# Patient Record
Sex: MALE | Race: WHITE | NOT HISPANIC OR LATINO | URBAN - METROPOLITAN AREA
[De-identification: names, ages, dates, MRNs, and addresses within clinical notes are randomized per-mention and may not be internally consistent; named-entity substitution may affect disease eponyms.]

---

## 2018-05-17 ENCOUNTER — OUTPATIENT (OUTPATIENT)
Dept: OUTPATIENT SERVICES | Facility: HOSPITAL | Age: 28
LOS: 1 days | Discharge: HOME | End: 2018-05-17

## 2018-05-17 ENCOUNTER — RESULT REVIEW (OUTPATIENT)
Age: 28
End: 2018-05-17

## 2018-05-23 LAB — SURGICAL PATHOLOGY STUDY: SIGNIFICANT CHANGE UP

## 2021-10-11 ENCOUNTER — INPATIENT (INPATIENT)
Facility: HOSPITAL | Age: 31
LOS: 0 days | Discharge: HOME | DRG: 683 | End: 2021-10-12
Attending: INTERNAL MEDICINE | Admitting: INTERNAL MEDICINE
Payer: COMMERCIAL

## 2021-10-11 VITALS
HEIGHT: 69 IN | RESPIRATION RATE: 18 BRPM | WEIGHT: 220.02 LBS | TEMPERATURE: 99 F | SYSTOLIC BLOOD PRESSURE: 133 MMHG | HEART RATE: 85 BPM | DIASTOLIC BLOOD PRESSURE: 97 MMHG | OXYGEN SATURATION: 96 %

## 2021-10-11 DIAGNOSIS — N17.9 ACUTE KIDNEY FAILURE, UNSPECIFIED: ICD-10-CM

## 2021-10-11 DIAGNOSIS — F41.9 ANXIETY DISORDER, UNSPECIFIED: ICD-10-CM

## 2021-10-11 DIAGNOSIS — Z83.3 FAMILY HISTORY OF DIABETES MELLITUS: ICD-10-CM

## 2021-10-11 DIAGNOSIS — K76.0 FATTY (CHANGE OF) LIVER, NOT ELSEWHERE CLASSIFIED: ICD-10-CM

## 2021-10-11 DIAGNOSIS — Z98.890 OTHER SPECIFIED POSTPROCEDURAL STATES: Chronic | ICD-10-CM

## 2021-10-11 DIAGNOSIS — R11.2 NAUSEA WITH VOMITING, UNSPECIFIED: ICD-10-CM

## 2021-10-11 DIAGNOSIS — K58.0 IRRITABLE BOWEL SYNDROME WITH DIARRHEA: ICD-10-CM

## 2021-10-11 DIAGNOSIS — Z88.0 ALLERGY STATUS TO PENICILLIN: ICD-10-CM

## 2021-10-11 DIAGNOSIS — E86.0 DEHYDRATION: ICD-10-CM

## 2021-10-11 DIAGNOSIS — Z79.899 OTHER LONG TERM (CURRENT) DRUG THERAPY: ICD-10-CM

## 2021-10-11 DIAGNOSIS — Z90.49 ACQUIRED ABSENCE OF OTHER SPECIFIED PARTS OF DIGESTIVE TRACT: Chronic | ICD-10-CM

## 2021-10-11 DIAGNOSIS — J98.11 ATELECTASIS: ICD-10-CM

## 2021-10-11 DIAGNOSIS — Z82.49 FAMILY HISTORY OF ISCHEMIC HEART DISEASE AND OTHER DISEASES OF THE CIRCULATORY SYSTEM: ICD-10-CM

## 2021-10-11 DIAGNOSIS — H11.32 CONJUNCTIVAL HEMORRHAGE, LEFT EYE: ICD-10-CM

## 2021-10-11 DIAGNOSIS — T36.8X5A ADVERSE EFFECT OF OTHER SYSTEMIC ANTIBIOTICS, INITIAL ENCOUNTER: ICD-10-CM

## 2021-10-11 DIAGNOSIS — N45.1 EPIDIDYMITIS: ICD-10-CM

## 2021-10-11 DIAGNOSIS — F12.90 CANNABIS USE, UNSPECIFIED, UNCOMPLICATED: ICD-10-CM

## 2021-10-11 DIAGNOSIS — Z20.822 CONTACT WITH AND (SUSPECTED) EXPOSURE TO COVID-19: ICD-10-CM

## 2021-10-11 DIAGNOSIS — R63.8 OTHER SYMPTOMS AND SIGNS CONCERNING FOOD AND FLUID INTAKE: ICD-10-CM

## 2021-10-11 LAB
ALBUMIN SERPL ELPH-MCNC: 4.5 G/DL — SIGNIFICANT CHANGE UP (ref 3.3–5)
ALP SERPL-CCNC: 92 U/L — SIGNIFICANT CHANGE UP (ref 40–120)
ALT FLD-CCNC: 37 U/L — SIGNIFICANT CHANGE UP (ref 10–45)
ANION GAP SERPL CALC-SCNC: 10 MMOL/L — SIGNIFICANT CHANGE UP (ref 5–17)
APPEARANCE UR: CLEAR — SIGNIFICANT CHANGE UP
AST SERPL-CCNC: 25 U/L — SIGNIFICANT CHANGE UP (ref 10–40)
BACTERIA # UR AUTO: PRESENT /HPF
BASOPHILS # BLD AUTO: 0.03 K/UL — SIGNIFICANT CHANGE UP (ref 0–0.2)
BASOPHILS NFR BLD AUTO: 0.3 % — SIGNIFICANT CHANGE UP (ref 0–2)
BILIRUB SERPL-MCNC: 0.5 MG/DL — SIGNIFICANT CHANGE UP (ref 0.2–1.2)
BILIRUB UR-MCNC: NEGATIVE — SIGNIFICANT CHANGE UP
BUN SERPL-MCNC: 35 MG/DL — HIGH (ref 7–23)
CALCIUM SERPL-MCNC: 9.5 MG/DL — SIGNIFICANT CHANGE UP (ref 8.4–10.5)
CHLORIDE SERPL-SCNC: 102 MMOL/L — SIGNIFICANT CHANGE UP (ref 96–108)
CO2 SERPL-SCNC: 28 MMOL/L — SIGNIFICANT CHANGE UP (ref 22–31)
COLOR SPEC: YELLOW — SIGNIFICANT CHANGE UP
CREAT ?TM UR-MCNC: 165 MG/DL — SIGNIFICANT CHANGE UP
CREAT SERPL-MCNC: 4.09 MG/DL — HIGH (ref 0.5–1.3)
DIFF PNL FLD: ABNORMAL
EOSINOPHIL # BLD AUTO: 0.02 K/UL — SIGNIFICANT CHANGE UP (ref 0–0.5)
EOSINOPHIL NFR BLD AUTO: 0.2 % — SIGNIFICANT CHANGE UP (ref 0–6)
EPI CELLS # UR: SIGNIFICANT CHANGE UP /HPF (ref 0–5)
GLUCOSE SERPL-MCNC: 96 MG/DL — SIGNIFICANT CHANGE UP (ref 70–99)
GLUCOSE UR QL: NEGATIVE — SIGNIFICANT CHANGE UP
HCT VFR BLD CALC: 45.9 % — SIGNIFICANT CHANGE UP (ref 39–50)
HGB BLD-MCNC: 15.2 G/DL — SIGNIFICANT CHANGE UP (ref 13–17)
IMM GRANULOCYTES NFR BLD AUTO: 0.3 % — SIGNIFICANT CHANGE UP (ref 0–1.5)
KETONES UR-MCNC: NEGATIVE — SIGNIFICANT CHANGE UP
LEUKOCYTE ESTERASE UR-ACNC: NEGATIVE — SIGNIFICANT CHANGE UP
LIDOCAIN IGE QN: 22 U/L — SIGNIFICANT CHANGE UP (ref 7–60)
LYMPHOCYTES # BLD AUTO: 2.18 K/UL — SIGNIFICANT CHANGE UP (ref 1–3.3)
LYMPHOCYTES # BLD AUTO: 21.8 % — SIGNIFICANT CHANGE UP (ref 13–44)
MCHC RBC-ENTMCNC: 29.3 PG — SIGNIFICANT CHANGE UP (ref 27–34)
MCHC RBC-ENTMCNC: 33.1 GM/DL — SIGNIFICANT CHANGE UP (ref 32–36)
MCV RBC AUTO: 88.6 FL — SIGNIFICANT CHANGE UP (ref 80–100)
MONOCYTES # BLD AUTO: 0.76 K/UL — SIGNIFICANT CHANGE UP (ref 0–0.9)
MONOCYTES NFR BLD AUTO: 7.6 % — SIGNIFICANT CHANGE UP (ref 2–14)
NEUTROPHILS # BLD AUTO: 6.96 K/UL — SIGNIFICANT CHANGE UP (ref 1.8–7.4)
NEUTROPHILS NFR BLD AUTO: 69.8 % — SIGNIFICANT CHANGE UP (ref 43–77)
NITRITE UR-MCNC: NEGATIVE — SIGNIFICANT CHANGE UP
NRBC # BLD: 0 /100 WBCS — SIGNIFICANT CHANGE UP (ref 0–0)
PH UR: 6 — SIGNIFICANT CHANGE UP (ref 5–8)
PLATELET # BLD AUTO: 225 K/UL — SIGNIFICANT CHANGE UP (ref 150–400)
POTASSIUM SERPL-MCNC: 4.2 MMOL/L — SIGNIFICANT CHANGE UP (ref 3.5–5.3)
POTASSIUM SERPL-SCNC: 4.2 MMOL/L — SIGNIFICANT CHANGE UP (ref 3.5–5.3)
PROT SERPL-MCNC: 7.6 G/DL — SIGNIFICANT CHANGE UP (ref 6–8.3)
PROT UR-MCNC: ABNORMAL MG/DL
RBC # BLD: 5.18 M/UL — SIGNIFICANT CHANGE UP (ref 4.2–5.8)
RBC # FLD: 12.5 % — SIGNIFICANT CHANGE UP (ref 10.3–14.5)
RBC CASTS # UR COMP ASSIST: < 5 /HPF — SIGNIFICANT CHANGE UP
SARS-COV-2 RNA SPEC QL NAA+PROBE: NEGATIVE — SIGNIFICANT CHANGE UP
SODIUM SERPL-SCNC: 140 MMOL/L — SIGNIFICANT CHANGE UP (ref 135–145)
SODIUM UR-SCNC: 32 MMOL/L — SIGNIFICANT CHANGE UP
SP GR SPEC: 1.01 — SIGNIFICANT CHANGE UP (ref 1–1.03)
TSH SERPL-MCNC: 1.9 UIU/ML — SIGNIFICANT CHANGE UP (ref 0.27–4.2)
UROBILINOGEN FLD QL: 0.2 E.U./DL — SIGNIFICANT CHANGE UP
WBC # BLD: 9.98 K/UL — SIGNIFICANT CHANGE UP (ref 3.8–10.5)
WBC # FLD AUTO: 9.98 K/UL — SIGNIFICANT CHANGE UP (ref 3.8–10.5)
WBC UR QL: < 5 /HPF — SIGNIFICANT CHANGE UP

## 2021-10-11 PROCEDURE — 74176 CT ABD & PELVIS W/O CONTRAST: CPT | Mod: 26,MA

## 2021-10-11 PROCEDURE — 76775 US EXAM ABDO BACK WALL LIM: CPT | Mod: 26

## 2021-10-11 PROCEDURE — 99223 1ST HOSP IP/OBS HIGH 75: CPT | Mod: GC

## 2021-10-11 PROCEDURE — 99285 EMERGENCY DEPT VISIT HI MDM: CPT

## 2021-10-11 RX ORDER — FLUOXETINE HCL 10 MG
1 CAPSULE ORAL
Qty: 0 | Refills: 0 | DISCHARGE

## 2021-10-11 RX ORDER — ONDANSETRON 8 MG/1
4 TABLET, FILM COATED ORAL ONCE
Refills: 0 | Status: COMPLETED | OUTPATIENT
Start: 2021-10-11 | End: 2021-10-11

## 2021-10-11 RX ORDER — METOCLOPRAMIDE HCL 10 MG
10 TABLET ORAL ONCE
Refills: 0 | Status: COMPLETED | OUTPATIENT
Start: 2021-10-11 | End: 2021-10-11

## 2021-10-11 RX ORDER — SODIUM CHLORIDE 9 MG/ML
1000 INJECTION INTRAMUSCULAR; INTRAVENOUS; SUBCUTANEOUS ONCE
Refills: 0 | Status: COMPLETED | OUTPATIENT
Start: 2021-10-11 | End: 2021-10-11

## 2021-10-11 RX ORDER — PANTOPRAZOLE SODIUM 20 MG/1
40 TABLET, DELAYED RELEASE ORAL
Refills: 0 | Status: DISCONTINUED | OUTPATIENT
Start: 2021-10-11 | End: 2021-10-12

## 2021-10-11 RX ADMIN — SODIUM CHLORIDE 1000 MILLILITER(S): 9 INJECTION INTRAMUSCULAR; INTRAVENOUS; SUBCUTANEOUS at 17:23

## 2021-10-11 RX ADMIN — SODIUM CHLORIDE 1000 MILLILITER(S): 9 INJECTION INTRAMUSCULAR; INTRAVENOUS; SUBCUTANEOUS at 18:49

## 2021-10-11 RX ADMIN — SODIUM CHLORIDE 1000 MILLILITER(S): 9 INJECTION INTRAMUSCULAR; INTRAVENOUS; SUBCUTANEOUS at 18:42

## 2021-10-11 RX ADMIN — ONDANSETRON 4 MILLIGRAM(S): 8 TABLET, FILM COATED ORAL at 17:19

## 2021-10-11 RX ADMIN — Medication 20 MILLIGRAM(S): at 17:19

## 2021-10-11 RX ADMIN — SODIUM CHLORIDE 1000 MILLILITER(S): 9 INJECTION INTRAMUSCULAR; INTRAVENOUS; SUBCUTANEOUS at 22:47

## 2021-10-11 RX ADMIN — Medication 104 MILLIGRAM(S): at 19:06

## 2021-10-11 NOTE — H&P ADULT - PROBLEM SELECTOR PLAN 6
F: s/p 2L NS bolus F: s/p 3L NS bolus in ED  E: Replete PRN (Caution with KAIT)  N: Diet, Renal  DVT ppx: None (IMPROVE 0)  GI ppx: Protonix  Bowel: None  Dispo: [facility] --> Home    FULL CODE

## 2021-10-11 NOTE — H&P ADULT - HISTORY OF PRESENT ILLNESS
30 yo M with PMHx ______ px to [facility] ED on 10/11 with 2d hx of nausea, vomiting, and chills. Patient reports after onset of sx, he presented to an urgent care center and was referred to px to the ED today for further workup and imaging.     Of note, patient reports recently being tx with 7d course of ciprofloxacin after px with testicular pain, likely epididymidis.     ED COURSE  VS: T 99F (oral), HR 85, /97, RR 18, SpO2 96% (RA)  Labs: BUN/Cr 35/4.09  UA: Negative, FeNa 0.6% (Pre-renal)   CTAP: Linear atelectasis in RML and B/L LL, fatty liver, no hydronephrosis, infiltration of perinephric fat B/L (can be seen in intrinsic renal failure)  Orders: Dicyclomine 20mg PO x1, Reglan 10mg IV x1, Zofran 4mg IV x1, 2L NS bolus    Patient is being admitted to [facility] for further management and assessment of acute renal failure of unclear etiology.  30 yo M with PMHx anxiety (fluoxetine), IBS px to [facility] ED on 10/11 with 2d hx of nausea, vomiting, and chills. Patient reports after onset of sx, he presented to an urgent care center and was referred to px to the ED today for further workup and imaging.     Of note, patient reports recently being tx with 7d course of ciprofloxacin after px with testicular pain, likely epididymidis.     ED COURSE  VS: T 99F (oral), HR 85, /97, RR 18, SpO2 96% (RA)  Labs: BUN/Cr 35/4.09  UA: Negative, FeNa 0.6% (Pre-renal)   CTAP: Linear atelectasis in RML and B/L LL, fatty liver, no hydronephrosis, infiltration of perinephric fat B/L (can be seen in intrinsic renal failure)  Orders: Dicyclomine 20mg PO x1, Reglan 10mg IV x1, Zofran 4mg IV x1, 2L NS bolus    Patient is being admitted to [facility] for further management and assessment of acute renal failure of unclear etiology.  30 yo M with PMHx anxiety (fluoxetine), IBS px to [facility] ED on 10/11 with 2d hx of nausea, vomiting, and chills.     Patient reports that for approx 2 wks, he had been experiencing loose BMs consistent with his usual IBS flares which had resolved 2 wks before current admission. No recent dietary changes, new foods, or other sx at that time. He reports on Wednesday 10/6, he began experiencing testicular swelling and pain and presented to an urgent care center where he was prescribed a 7d course of ciprofloxacin which he reports adherence     Patient reports after onset of sx, he presented to an urgent care center and was referred to px to the ED today for further workup and imaging.     Of note, patient reports recently being tx with 7d course of ciprofloxacin after px with testicular pain, likely epididymidis.     ED COURSE  VS: T 99F (oral), HR 85, /97, RR 18, SpO2 96% (RA)  Labs: BUN/Cr 35/4.09  UA: Negative, FeNa 0.6% (Pre-renal)   CTAP: Linear atelectasis in RML and B/L LL, fatty liver, no hydronephrosis, infiltration of perinephric fat B/L (can be seen in intrinsic renal failure)  Orders: Dicyclomine 20mg PO x1, Reglan 10mg IV x1, Zofran 4mg IV x1, 2L NS bolus    Patient is being admitted to [facility] for further management and assessment of acute renal failure of unclear etiology.  30 yo M with PMHx anxiety (fluoxetine), IBS px to [facility] ED on 10/11 with 2d hx of nausea, vomiting, and chills.     Patient reports that for approx 2 wks, he had been experiencing loose BMs consistent with his usual IBS flares which had resolved 2 wks before current admission. No recent dietary changes, new foods, or other sx at that time. He reports on Wednesday 10/6, he began experiencing testicular swelling and pain and presented to an urgent care center where he was prescribed a 7d course of ciprofloxacin which he reports adherence, last dose taken Luis A 10/9 when he began experiencing nausea, NBNB vomiting, and chills. No sick contacts or recent travel. He presented to an urgent care earlier today on day of admission who recommended he present to an ED for further imaging and evaluation.     On ROS, he reports decreased PO intake over the past week as well as generalized abdominal pain. He denies fevers, chills, chest pain, SOB, CEDEÑO, diarrhea, dysuria, testicular pain, urinary retention. Emesis is non-bloody, non-bilious with food particles only. He states his stools have been more formed recently as well with resolution of diarrhea.    ED COURSE  VS: T 99F (oral), HR 85, /97, RR 18, SpO2 96% (RA)  Labs: BUN/Cr 35/4.09  UA: Negative, FeNa 0.6% (Pre-renal)   CTAP: Linear atelectasis in RML and B/L LL, fatty liver, no hydronephrosis, infiltration of perinephric fat B/L (can be seen in intrinsic renal failure)  Orders: Dicyclomine 20mg PO x1, Reglan 10mg IV x1, Zofran 4mg IV x1, 2L NS bolus    Patient is being admitted to [facility] for further management and assessment of acute renal failure of unclear etiology, likely pre-renal.  30 yo M with PMHx anxiety (fluoxetine), IBS px to [facility] ED on 10/11 with 2d hx of nausea, vomiting, and chills.     Patient reports that for approx 2 wks, he had been experiencing loose BMs consistent with his usual IBS flares which had resolved 2 wks before current admission. No recent dietary changes, new foods, or other sx at that time. He reports on Wednesday 10/6, he began experiencing testicular swelling and pain and presented to an urgent care center where he was prescribed a 7d course of ciprofloxacin which he reports adherence, last dose taken Luis A 10/9 when he began experiencing nausea, NBNB vomiting, and chills. No sick contacts or recent travel. He presented to an urgent care earlier today on day of admission who recommended he present to an ED for further imaging and evaluation.     On ROS, he reports decreased PO intake over the past week as well as generalized abdominal pain. He denies fevers, chest pain, SOB, CEDEÑO, diarrhea, dysuria, testicular pain, urinary retention. Emesis is non-bloody, non-bilious with food particles only. He states his stools have been more formed recently as well with resolution of diarrhea.    ED COURSE  VS: T 99F (oral), HR 85, /97, RR 18, SpO2 96% (RA)  Labs: BUN/Cr 35/4.09  UA: Negative, FeNa 0.6% (Pre-renal)   CTAP: Linear atelectasis in RML and B/L LL, fatty liver, no hydronephrosis, infiltration of perinephric fat B/L (can be seen in intrinsic renal failure)  Orders: Dicyclomine 20mg PO x1, Reglan 10mg IV x1, Zofran 4mg IV x1, 2L NS bolus    Patient is being admitted to [facility] for further management and assessment of acute renal failure of unclear etiology, likely pre-renal.

## 2021-10-11 NOTE — ED ADULT TRIAGE NOTE - CHIEF COMPLAINT QUOTE
Pt c/o generalized abdominal pain for x2 days with +N/V and chills. Pt denies dysuria, diarrhea, or documented fevers. Seen at [facility] and told to come to ED for CT. Hx of appendectomy. Pt currently on abx for "surrounding part of my testicle being inflamed."

## 2021-10-11 NOTE — ED PROVIDER NOTE - CLINICAL SUMMARY MEDICAL DECISION MAKING FREE TEXT BOX
plan labs, ivf, ua, tx symptoms and reassess.  will need CT given new renal failure to r/o obstruction.

## 2021-10-11 NOTE — H&P ADULT - PROBLEM SELECTOR PLAN 4
On CT on admission, findings concerning for atelectasis in RML and B/L LL. Not in respiratory distress and denies SOB/CEDEÑO. No hx of asthma, COPD. Social hx notable for daily use of vape pen for marijuana.   - Encourage vape pen cessation   - Monitor respiratory status

## 2021-10-11 NOTE — ED ADULT NURSE NOTE - OBJECTIVE STATEMENT
Presents to ED c/o vomiting and abdominal pain. Per patient he had testicular pain last week, started on ciprofloxacin and testicular pain has resolved since. Pt experienced nausea, vomiting x 24 hours since Saturday. Reports undigested food as vomitus. Denies diarrhea, no sick contacts. Reports last episode of vomiting at 4am this morning, no more episodes since taking zofran ODT.

## 2021-10-11 NOTE — H&P ADULT - ATTENDING COMMENTS
reviewed pertinent data , h&p  patient seen and examined overnight     pt w/ recurring epididymitis , last one occuring last year in setting of jujitsu event, took ciprofloxacin w/ no adverse event. given cipro for recurring symptoms last week in setting of sitting on couch.     pt in NAD, left medial eye, cardiac, lung findings as above, pt w/ slightly dry MM, pt w/ soft abdomen, nontender, nondistended.  exam deferred by patient. no lower ext edema/ tenderness b/l     1. KAIT in setting of pre-renal effects of n/v from likely adverse effects of ciprofloxacin, listed as intolerance. will avoid. on IVFs o/n, followup AM renal function, renal sonogram, appreciate renal recs    2.  epididymitis: pt reports that  symptoms resolved for last 2 days, followup testicular sonogram. agree w/ holding off on further abx, pt deferring STD testing.        rest of  plan as above

## 2021-10-11 NOTE — H&P ADULT - PROBLEM SELECTOR PLAN 2
- F/U EKG (QTC)  - Zofran PRN On admission, initial labs remarkable for BUN/Cr 35/4.09 with negative UA however urine lytes consistent with pre-renal etiology (FeNa 0.6%) and patient with decreased PO intake on hx and dry MM on exam. Pt reports no hx of renal disease/dysfxn and no urinary sx on ROS. Denies dysuria, hematuria, or urinary retention.   - C/w IVF (s/p 3L NS ordered in ED)  - F/U repeat UA, urine lytes, protein-creatinine ratio, osm in AM  - Bladder scan (R/O urinary retention)  - F/U renal ultrasound (performed, pending final read)  - Renal consulted On admission, initial labs remarkable for BUN/Cr 35/4.09 with negative UA however urine lytes consistent with pre-renal etiology (FeNa 0.6%) and patient with decreased PO intake on hx and dry MM on exam. Pt reports no hx of renal disease/dysfxn and no urinary sx on ROS. Denies dysuria, hematuria, or urinary retention.   - C/w IVF (s/p 3L NS ordered in ED)  - F/U repeat UA, urine lytes, protein-creatinine ratio, osm in AM  - Bladder scan x1 (R/O urinary retention)  - F/U renal ultrasound (performed, pending final read)  - Renal consulted Pt px with 2d hx of nausea and NBNB vomiting. Not meeting SIRS criteria on admission. Recently tx with abx (ciprofloxacin) for epididymidis. On exam, non-toxic appearing with dry MM and generalized abd tenderness. No CVA tenderness. Nausea/vomiting more likely in setting of abx use, less likely infectious in origin.   - F/U EKG (QTC)  - Zofran PRN for nausea/vomiting  - Protonix QD for GI ppx

## 2021-10-11 NOTE — CHART NOTE - NSCHARTNOTEFT_GEN_A_CORE
Nephrology Chart Note    Patient is a 31y old  Male who presents with a chief complaint of Abdominal pain (11 Oct 2021 20:24)      HPI:  31M w/ no known hx of kidney disease p/w  2d hx of nausea, vomiting, and chills. Per chart history and team; pt recently treated outpatient for epididymidis with ciprofloxacin. He has received 2L of NS in the ED  and is still currently making urine. On CT scan found to have bilateral perinephric fat stranding. On labs in ED found to have creatinine of 4.09 concerning for acute renal failure.     ED COURSE  VS: T 99F (oral), HR 85, /97, RR 18, SpO2 96% (RA)  Labs: BUN/Cr 35/4.09  UA: Negative, FeNa 0.6% (Pre-renal)   CTAP: Linear atelectasis in RML and B/L LL, fatty liver, no hydronephrosis, infiltration of perinephric fat B/L (can be seen in intrinsic renal failure)  Orders: Dicyclomine 20mg PO x1, Reglan 10mg IV x1, Zofran 4mg IV x1, 2L NS bolus      PAST MEDICAL & SURGICAL HISTORY:        Allergies:  penicillin (Unknown)      Home Medications:       Hospital Medications:   MEDICATIONS  (STANDING):      SOCIAL HISTORY:  Denies ETOh, Smoking,     Family History:  FAMILY HISTORY:  non-contributory      VITALS:  T(F): 99 (10-11-21 @ 16:14), Max: 99 (10-11-21 @ 16:14)  HR: 85 (10-11-21 @ 16:14)  BP: 133/97 (10-11-21 @ 16:14)  RR: 18 (10-11-21 @ 16:14)  SpO2: 96% (10-11-21 @ 16:14)  Wt(kg): --    Height (cm): 175.3 (10-11 @ 16:14)  Weight (kg): 99.8 (10-11 @ 16:14)  BMI (kg/m2): 32.5 (10-11 @ 16:14)  BSA (m2): 2.15 (10-11 @ 16:14)  CAPILLARY BLOOD GLUCOSE              LABS:  10-11    140  |  102  |  35<H>  ----------------------------<  96  4.2   |  28  |  4.09<H>    Ca    9.5      11 Oct 2021 17:18    TPro  7.6  /  Alb  4.5  /  TBili  0.5  /  DBili      /  AST  25  /  ALT  37  /  AlkPhos  92  10    Creatinine Trend: 4.09 <--                        15.2   9.98  )-----------( 225      ( 11 Oct 2021 17:17 )             45.9     Urine Studies:  Urinalysis Basic - ( 11 Oct 2021 17:18 )    Color: Yellow / Appearance: Clear / S.015 / pH:   Gluc:  / Ketone: NEGATIVE  / Bili: Negative / Urobili: 0.2 E.U./dL   Blood:  / Protein: Trace mg/dL / Nitrite: NEGATIVE   Leuk Esterase: NEGATIVE / RBC: < 5 /HPF / WBC < 5 /HPF   Sq Epi:  / Non Sq Epi: 0-5 /HPF / Bacteria: Present /HPF      Sodium, Random Urine: 32 mmol/L (10-11 @ 19:26)  Creatinine, Random Urine: 165 mg/dL (10-11 @ 19:26)        Assessment/Plan:     31M w/ no known hx of kidney disease p/w  2d hx of nausea, vomiting, and chills found to have Cr of 4.09 concerning for ARF    #KAIT probably pre-renal  normal baseline creatinine unknown,   Pt presenting to the ED in the setting of nausea and vomiting for 2 days with chills. On labs found to have Cr of 4.09  Bun of 35, FeNa calculated to be 0.6% suggestive of pre-renal etiology. Li 32 after receiving 2L of NS in the ED.  Has findings of bilateral perinephric stranding on CT. His UA has trace blood, protein and bacteria. Other things to consider would be  nephrolithiasis vs pyelonephritis as culprits. Or his kidney function can be decreased in the setting of poor PO in take w/ vomiting  -Trend BMP daily  -Repeat UA, urine sodium, osm and creatinine w/ urine protein creatinine ratio  -renal sono to rule out obstruction  -strict I/Os   -renal diet   -avoid nephrotoxic meds     Thank you for the opportunity to participate in the care of your patient. The nephrology service remains available to assist with any questions or concerns. Please feel free to reach us by paging the on-call nephrology fellow for urgent issues or as below.     Hermelindo Barrow D.O.  PGY 4 - Nephrology Fellow  [phone]

## 2021-10-11 NOTE — H&P ADULT - PROBLEM SELECTOR PLAN 1
- FeNa 0.6% (Pre-renal)  - Renal consulted in ED - FeNa 0.6% (Pre-renal)  - F/U repeat UA, urine lytes, protein-creatinine ratio, osm in AM  - Bladder scan (R/O urinary retention)  - Renal consulted Pt px with 2d hx of nausea and NBNB vomiting. Not meeting SIRS criteria on admission. Recently tx with abx (ciprofloxacin) for epididymidis. On exam, non-toxic appearing with dry MM and generalized abd tenderness. No CVA tenderness. Nausea/vomiting more likely in setting of abx use, less likely infectious in origin.   - F/U EKG (QTC)  - Zofran PRN Pt px with 2d hx of nausea and NBNB vomiting. Not meeting SIRS criteria on admission. Recently tx with abx (ciprofloxacin) for epididymidis. On exam, non-toxic appearing with dry MM and generalized abd tenderness. No CVA tenderness. Nausea/vomiting more likely in setting of abx use, less likely infectious in origin.   - F/U EKG (QTC)  - Zofran PRN for nausea/vomiting  - Protonix QD for GI ppx On admission, initial labs remarkable for BUN/Cr 35/4.09 with negative UA however urine lytes consistent with pre-renal etiology (FeNa 0.6%) and patient with decreased PO intake on hx and dry MM on exam. Pt reports no hx of renal disease/dysfxn and no urinary sx on ROS. Denies dysuria, hematuria, or urinary retention.   - C/w IVF (s/p 3L NS ordered in ED)  - F/U repeat UA, urine lytes, protein-creatinine ratio, osm in AM  - Bladder scan x1 (R/O urinary retention)  - F/U renal ultrasound (performed, pending final read)  - Renal consulted

## 2021-10-11 NOTE — H&P ADULT - NSHPSOCIALHISTORY_GEN_ALL_CORE
Smoking 1 cigarette/month, former smoker of 1PPD x several years. Social etOH use, 1 drink per month. Reports daily marijuana use via vape pen. Lives at home in apartment with girlfriend. Ambulates on own at baseline. Able to complete all ADLs. Smoking 1 cigarette/month, former smoker of 1PPD x several years. Social etOH use, 1 drink per month. Reports daily marijuana use via vape pen. Lives at home in apartment with girlfriend. Monogamous with girlfriend only, uses barrier protection intermittently. No concern for STI, reports being tested recently including for HIV and was negative. Ambulates on own at baseline. Able to complete all ADLs.

## 2021-10-11 NOTE — H&P ADULT - NSHPPHYSICALEXAM_GEN_ALL_CORE
VITAL SIGNS:  T(C): 37.2 (10-11-21 @ 16:14), Max: 37.2 (10-11-21 @ 16:14)  T(F): 99 (10-11-21 @ 16:14), Max: 99 (10-11-21 @ 16:14)  HR: 85 (10-11-21 @ 16:14) (85 - 85)  BP: 133/97 (10-11-21 @ 16:14) (133/97 - 133/97)  RR: 18 (10-11-21 @ 16:14) (18 - 18)  SpO2: 96% (10-11-21 @ 16:14) (96% - 96%)    PHYSICAL EXAM:    Constitutional: WDWN resting comfortably in bed; NAD  Head: NC/AT  Eyes: PERRL, EOMI, clear conjunctiva  ENT: no nasal discharge; uvula midline, no oropharyngeal erythema or exudates; MMM  Neck: supple; no JVD or thyromegaly  Respiratory: CTA B/L; no W/R/R, no retractions  Cardiac: +S1/S2; RRR; no M/R/G;  Gastrointestinal: soft, NT/ND; no rebound or guarding; +BSx4  Extremities: WWP, no clubbing or cyanosis; no peripheral edema  Musculoskeletal: NROM x4; no joint swelling, tenderness or erythema  Vascular: 2+ radial, femoral, DP/PT pulses B/L  Dermatologic: skin warm, dry and intact; no rashes, wounds, or scars  Neurologic: AAOx3; CNII-XII grossly intact; no focal deficits  Psychiatric: affect and characteristics of appearance, verbalizations, behaviors are appropriate VITAL SIGNS:  T(C): 37.2 (10-11-21 @ 16:14), Max: 37.2 (10-11-21 @ 16:14)  T(F): 99 (10-11-21 @ 16:14), Max: 99 (10-11-21 @ 16:14)  HR: 85 (10-11-21 @ 16:14) (85 - 85)  BP: 133/97 (10-11-21 @ 16:14) (133/97 - 133/97)  RR: 18 (10-11-21 @ 16:14) (18 - 18)  SpO2: 96% (10-11-21 @ 16:14) (96% - 96%)    PHYSICAL EXAM:    Constitutional: WDWN resting comfortably in bed; NAD  Head: NC/AT  Eyes: Small conjunctival hemorrhage in L eye  ENT: dry MM  Respiratory: CTA B/L; no W/R/R, no retractions  Cardiac: +S1/S2; RRR; no M/R/G  Gastrointestinal: soft, TTP in all quadrants, no rebound/guarding. BSx4. No suprapubic tenderness. No CVA tenderness.   Extremities: WWP, no clubbing or cyanosis; no peripheral edema  Musculoskeletal: NROM x4; no joint swelling, tenderness or erythema  Vascular: 2+ radial, DP/PT pulses B/L  Neurologic: AAOx3; CNII-XII grossly intact; no focal deficits

## 2021-10-11 NOTE — H&P ADULT - PROBLEM SELECTOR PLAN 3
Pt reports recently being seen as an outpatient at urgent care for epididymitis. He reports experiencing several day hx of testicular pain and swelling, denies dysuria/hematuria or any other sx. He was started on ciprofloxacin 500mg BID for a total 7d course (10/6-10/12) however he reports taking last dose on 10/9 and has not been able to continue due to 2d hx of nausea/vomiting. Reports sx have improved since starting abx. Pt reports recently being seen as an outpatient at urgent care for epididymitis. He reports experiencing several day hx of testicular pain and swelling, denies dysuria/hematuria or any other sx. He was started on ciprofloxacin 500mg BID for a total 7d course (10/6-10/12) however he reports taking last dose on 10/9 and has not been able to continue due to 2d hx of nausea/vomiting. Reports sx have improved since starting abx.  - Pt deferring STI/HIV testing as reports being monogamous with gf and has tested negative recently   - F/U testicular U/S (ordered)

## 2021-10-11 NOTE — ED ADULT NURSE NOTE - CHIEF COMPLAINT QUOTE
Pt c/o generalized abdominal pain for x2 days with +N/V and chills. Pt denies dysuria, diarrhea, or documented fevers. Seen at [facility] and told to come to ED for CT. Hx of appendectomy. Pt currently on abx for "surrounding part of my testicle being inflamed."

## 2021-10-11 NOTE — H&P ADULT - NSHPLABSRESULTS_GEN_ALL_CORE
LABS:                         15.2   9.98  )-----------( 225      ( 11 Oct 2021 17:17 )             45.9     10-11    140  |  102  |  35<H>  ----------------------------<  96  4.2   |  28  |  4.09<H>    Ca    9.5      11 Oct 2021 17:18    TPro  7.6  /  Alb  4.5  /  TBili  0.5  /  DBili  x   /  AST  25  /  ALT  37  /  AlkPhos  92  10-11      Urinalysis Basic - ( 11 Oct 2021 17:18 )    Color: Yellow / Appearance: Clear / S.015 / pH: x  Gluc: x / Ketone: NEGATIVE  / Bili: Negative / Urobili: 0.2 E.U./dL   Blood: x / Protein: Trace mg/dL / Nitrite: NEGATIVE   Leuk Esterase: NEGATIVE / RBC: < 5 /HPF / WBC < 5 /HPF   Sq Epi: x / Non Sq Epi: 0-5 /HPF / Bacteria: Present /HPF      RADIOLOGY, EKG & ADDITIONAL TESTS: Reviewed.     < from: CT Abdomen and Pelvis No Cont (10.11.21 @ 18:19) >  1. No renal, ureteral, or bladder stone.  2. No hydronephrosis.  3. Infiltration of perinephric fat bilaterally. This could be seen in patients with renal failure. Clinical correlation recommended.  4. Fatty liver.

## 2021-10-11 NOTE — H&P ADULT - PROBLEM SELECTOR PLAN 5
On CT on admission, findings concerning for fatty liver. No abnormalities in liver enzymes on admission. No hx of hepatitis or hepatitis risk factors (no travel to endemic countries, no IVDU). Reports social etOH use, 1 drink/mon with last drink on Saturday prior to admission (1 beer). On exam, generalized TTP in abdomen.   - Outpatient follow-up

## 2021-10-11 NOTE — ED PROVIDER NOTE - OBJECTIVE STATEMENT
31 M hx IBS p/w loose stool, ab cramping and poor po intake.  No fever, chills.  No nephrotoxic agent use.  Denies urinary symptoms.  + FH of renal dz.

## 2021-10-11 NOTE — H&P ADULT - ASSESSMENT
30 yo M with PMHx anxiety (fluoxetine), IBS px to [facility] ED on 10/11 with 2d hx of nausea, vomiting, and chills. On admission, found to have pre-renal KAIT with dry MM on exam however CT findings concerning for intrinsic renal disease. Renal consulted in ED and patient is being admitted to [facility] for further management and assessment of acute renal failure of unclear etiology, likely pre-renal.

## 2021-10-12 ENCOUNTER — TRANSCRIPTION ENCOUNTER (OUTPATIENT)
Age: 31
End: 2021-10-12

## 2021-10-12 VITALS
HEART RATE: 86 BPM | SYSTOLIC BLOOD PRESSURE: 135 MMHG | OXYGEN SATURATION: 97 % | HEIGHT: 69 IN | WEIGHT: 225.09 LBS | DIASTOLIC BLOOD PRESSURE: 78 MMHG | TEMPERATURE: 98 F | RESPIRATION RATE: 18 BRPM

## 2021-10-12 LAB
ANION GAP SERPL CALC-SCNC: 11 MMOL/L — SIGNIFICANT CHANGE UP (ref 5–17)
APPEARANCE UR: CLEAR — SIGNIFICANT CHANGE UP
BILIRUB UR-MCNC: NEGATIVE — SIGNIFICANT CHANGE UP
BLD GP AB SCN SERPL QL: NEGATIVE — SIGNIFICANT CHANGE UP
BUN SERPL-MCNC: 26 MG/DL — HIGH (ref 7–23)
CALCIUM SERPL-MCNC: 8.9 MG/DL — SIGNIFICANT CHANGE UP (ref 8.4–10.5)
CHLORIDE SERPL-SCNC: 106 MMOL/L — SIGNIFICANT CHANGE UP (ref 96–108)
CO2 SERPL-SCNC: 24 MMOL/L — SIGNIFICANT CHANGE UP (ref 22–31)
COLOR SPEC: YELLOW — SIGNIFICANT CHANGE UP
COVID-19 SPIKE DOMAIN AB INTERP: POSITIVE
COVID-19 SPIKE DOMAIN ANTIBODY RESULT: >250 U/ML — HIGH
CREAT ?TM UR-MCNC: 59 MG/DL — SIGNIFICANT CHANGE UP
CREAT ?TM UR-MCNC: 60 MG/DL — SIGNIFICANT CHANGE UP
CREAT SERPL-MCNC: 3.07 MG/DL — HIGH (ref 0.5–1.3)
DIFF PNL FLD: NEGATIVE — SIGNIFICANT CHANGE UP
GLUCOSE SERPL-MCNC: 112 MG/DL — HIGH (ref 70–99)
GLUCOSE UR QL: NEGATIVE — SIGNIFICANT CHANGE UP
HCT VFR BLD CALC: 40.7 % — SIGNIFICANT CHANGE UP (ref 39–50)
HGB BLD-MCNC: 13.6 G/DL — SIGNIFICANT CHANGE UP (ref 13–17)
KETONES UR-MCNC: NEGATIVE — SIGNIFICANT CHANGE UP
LEUKOCYTE ESTERASE UR-ACNC: NEGATIVE — SIGNIFICANT CHANGE UP
MAGNESIUM SERPL-MCNC: 2 MG/DL — SIGNIFICANT CHANGE UP (ref 1.6–2.6)
MCHC RBC-ENTMCNC: 30 PG — SIGNIFICANT CHANGE UP (ref 27–34)
MCHC RBC-ENTMCNC: 33.4 GM/DL — SIGNIFICANT CHANGE UP (ref 32–36)
MCV RBC AUTO: 89.8 FL — SIGNIFICANT CHANGE UP (ref 80–100)
NITRITE UR-MCNC: NEGATIVE — SIGNIFICANT CHANGE UP
NRBC # BLD: 0 /100 WBCS — SIGNIFICANT CHANGE UP (ref 0–0)
OSMOLALITY UR: 333 MOSM/KG — SIGNIFICANT CHANGE UP (ref 300–900)
OSMOLALITY UR: 334 MOSM/KG — SIGNIFICANT CHANGE UP (ref 300–900)
PH UR: 7 — SIGNIFICANT CHANGE UP (ref 5–8)
PHOSPHATE SERPL-MCNC: 4 MG/DL — SIGNIFICANT CHANGE UP (ref 2.5–4.5)
PLATELET # BLD AUTO: 206 K/UL — SIGNIFICANT CHANGE UP (ref 150–400)
POTASSIUM SERPL-MCNC: 4.1 MMOL/L — SIGNIFICANT CHANGE UP (ref 3.5–5.3)
POTASSIUM SERPL-SCNC: 4.1 MMOL/L — SIGNIFICANT CHANGE UP (ref 3.5–5.3)
PROT ?TM UR-MCNC: 7 MG/DL — SIGNIFICANT CHANGE UP (ref 0–12)
PROT UR-MCNC: NEGATIVE MG/DL — SIGNIFICANT CHANGE UP
PROT/CREAT UR-RTO: 0.1 RATIO — SIGNIFICANT CHANGE UP (ref 0–0.2)
RBC # BLD: 4.53 M/UL — SIGNIFICANT CHANGE UP (ref 4.2–5.8)
RBC # FLD: 12.4 % — SIGNIFICANT CHANGE UP (ref 10.3–14.5)
RH IG SCN BLD-IMP: POSITIVE — SIGNIFICANT CHANGE UP
SARS-COV-2 IGG+IGM SERPL QL IA: >250 U/ML — HIGH
SARS-COV-2 IGG+IGM SERPL QL IA: POSITIVE
SODIUM SERPL-SCNC: 141 MMOL/L — SIGNIFICANT CHANGE UP (ref 135–145)
SODIUM UR-SCNC: 113 MMOL/L — SIGNIFICANT CHANGE UP
SODIUM UR-SCNC: 113 MMOL/L — SIGNIFICANT CHANGE UP
SP GR SPEC: 1.01 — SIGNIFICANT CHANGE UP (ref 1–1.03)
URATE SERPL-MCNC: 8.5 MG/DL — SIGNIFICANT CHANGE UP (ref 3.4–8.8)
UROBILINOGEN FLD QL: 0.2 E.U./DL — SIGNIFICANT CHANGE UP
WBC # BLD: 8.61 K/UL — SIGNIFICANT CHANGE UP (ref 3.8–10.5)
WBC # FLD AUTO: 8.61 K/UL — SIGNIFICANT CHANGE UP (ref 3.8–10.5)

## 2021-10-12 PROCEDURE — 87635 SARS-COV-2 COVID-19 AMP PRB: CPT

## 2021-10-12 PROCEDURE — 99285 EMERGENCY DEPT VISIT HI MDM: CPT

## 2021-10-12 PROCEDURE — 81001 URINALYSIS AUTO W/SCOPE: CPT

## 2021-10-12 PROCEDURE — 86900 BLOOD TYPING SEROLOGIC ABO: CPT

## 2021-10-12 PROCEDURE — 93005 ELECTROCARDIOGRAM TRACING: CPT

## 2021-10-12 PROCEDURE — 82550 ASSAY OF CK (CPK): CPT

## 2021-10-12 PROCEDURE — 86850 RBC ANTIBODY SCREEN: CPT

## 2021-10-12 PROCEDURE — 83735 ASSAY OF MAGNESIUM: CPT

## 2021-10-12 PROCEDURE — 84300 ASSAY OF URINE SODIUM: CPT

## 2021-10-12 PROCEDURE — 84156 ASSAY OF PROTEIN URINE: CPT

## 2021-10-12 PROCEDURE — 80048 BASIC METABOLIC PNL TOTAL CA: CPT

## 2021-10-12 PROCEDURE — 81003 URINALYSIS AUTO W/O SCOPE: CPT

## 2021-10-12 PROCEDURE — 80053 COMPREHEN METABOLIC PANEL: CPT

## 2021-10-12 PROCEDURE — 76775 US EXAM ABDO BACK WALL LIM: CPT

## 2021-10-12 PROCEDURE — 99239 HOSP IP/OBS DSCHRG MGMT >30: CPT | Mod: GC

## 2021-10-12 PROCEDURE — 86901 BLOOD TYPING SEROLOGIC RH(D): CPT

## 2021-10-12 PROCEDURE — 83690 ASSAY OF LIPASE: CPT

## 2021-10-12 PROCEDURE — 85027 COMPLETE CBC AUTOMATED: CPT

## 2021-10-12 PROCEDURE — 74176 CT ABD & PELVIS W/O CONTRAST: CPT | Mod: MA

## 2021-10-12 PROCEDURE — 84550 ASSAY OF BLOOD/URIC ACID: CPT

## 2021-10-12 PROCEDURE — 83935 ASSAY OF URINE OSMOLALITY: CPT

## 2021-10-12 PROCEDURE — 96361 HYDRATE IV INFUSION ADD-ON: CPT

## 2021-10-12 PROCEDURE — 84100 ASSAY OF PHOSPHORUS: CPT

## 2021-10-12 PROCEDURE — 84443 ASSAY THYROID STIM HORMONE: CPT

## 2021-10-12 PROCEDURE — 96375 TX/PRO/DX INJ NEW DRUG ADDON: CPT

## 2021-10-12 PROCEDURE — 82570 ASSAY OF URINE CREATININE: CPT

## 2021-10-12 PROCEDURE — 36415 COLL VENOUS BLD VENIPUNCTURE: CPT

## 2021-10-12 PROCEDURE — 96374 THER/PROPH/DIAG INJ IV PUSH: CPT

## 2021-10-12 PROCEDURE — 99253 IP/OBS CNSLTJ NEW/EST LOW 45: CPT

## 2021-10-12 PROCEDURE — 86769 SARS-COV-2 COVID-19 ANTIBODY: CPT

## 2021-10-12 PROCEDURE — 85025 COMPLETE CBC W/AUTO DIFF WBC: CPT

## 2021-10-12 RX ORDER — SODIUM CHLORIDE 9 MG/ML
1000 INJECTION INTRAMUSCULAR; INTRAVENOUS; SUBCUTANEOUS
Refills: 0 | Status: DISCONTINUED | OUTPATIENT
Start: 2021-10-12 | End: 2021-10-12

## 2021-10-12 RX ORDER — CIPROFLOXACIN LACTATE 400MG/40ML
1 VIAL (ML) INTRAVENOUS
Qty: 0 | Refills: 0 | DISCHARGE

## 2021-10-12 RX ORDER — INFLUENZA VIRUS VACCINE 15; 15; 15; 15 UG/.5ML; UG/.5ML; UG/.5ML; UG/.5ML
0.5 SUSPENSION INTRAMUSCULAR ONCE
Refills: 0 | Status: DISCONTINUED | OUTPATIENT
Start: 2021-10-12 | End: 2021-10-12

## 2021-10-12 RX ADMIN — SODIUM CHLORIDE 140 MILLILITER(S): 9 INJECTION INTRAMUSCULAR; INTRAVENOUS; SUBCUTANEOUS at 10:34

## 2021-10-12 RX ADMIN — SODIUM CHLORIDE 140 MILLILITER(S): 9 INJECTION INTRAMUSCULAR; INTRAVENOUS; SUBCUTANEOUS at 06:50

## 2021-10-12 RX ADMIN — PANTOPRAZOLE SODIUM 40 MILLIGRAM(S): 20 TABLET, DELAYED RELEASE ORAL at 06:54

## 2021-10-12 NOTE — CONSULT NOTE ADULT - SUBJECTIVE AND OBJECTIVE BOX
Patient is a 31y old  Male who presents with a chief complaint of Abdominal pain (12 Oct 2021 09:06)      HPI:  31M with pmhx of anxiety who reports to the ED in the setting of 2 days of nausea, fever and chills. Per patient about 2 weeks ago he was having diarrhea like episodes. He  then subsequently started to have testicular pain and was treated with ciprofloxacin for epididymitis and started to experience vomiting every hour for about 1-2 days. During this time period he also endorses going to the park an long boarding which may have contributed to him getting dehydrated. Per patient he has never been told he has any history of kidney disease and does endorse having poor PO intake. Nephrology consulted for acute renal failure    ED COURSE  VS: T 99F (oral), HR 85, /97, RR 18, SpO2 96% (RA)  Labs: BUN/Cr 35/4.09  UA: Negative, FeNa 0.6% (Pre-renal)   CTAP: Linear atelectasis in RML and B/L LL, fatty liver, no hydronephrosis, infiltration of perinephric fat B/L (can be seen in intrinsic renal failure)  Orders: Dicyclomine 20mg PO x1, Reglan 10mg IV x1, Zofran 4mg IV x1, 2L NS bolus    Patient is being admitted to [facility] for further management and assessment of acute renal failure of unclear etiology, likely pre-renal.  (11 Oct 2021 20:24)      PAST MEDICAL & SURGICAL HISTORY:  Anxiety    Irritable bowel syndrome    S/P appendectomy    S/P hernia repair          Allergies:  ciprofloxacin (Vomiting)  penicillin (Rash; Urticaria; Hives)      Home Medications:   influenza   Vaccine 0.5 milliLiter(s) IntraMuscular once  pantoprazole    Tablet 40 milliGRAM(s) Oral before breakfast  sodium chloride 0.9%. 1000 milliLiter(s) IV Continuous <Continuous>      Hospital Medications:   MEDICATIONS  (STANDING):  influenza   Vaccine 0.5 milliLiter(s) IntraMuscular once  pantoprazole    Tablet 40 milliGRAM(s) Oral before breakfast  sodium chloride 0.9%. 1000 milliLiter(s) (140 mL/Hr) IV Continuous <Continuous>      SOCIAL HISTORY:  Denies ETOh, Smoking,     Family History:  FAMILY HISTORY:  FHx: hypertension (Mother)    FHx: diabetes mellitus (Mother)          VITALS:  T(F): 98.5 (10-12-21 @ 10:47), Max: 99 (10-11-21 @ 16:14)  HR: 86 (10-12-21 @ 10:47)  BP: 135/78 (10-12-21 @ 10:47)  RR: 18 (10-12-21 @ 10:47)  SpO2: 97% (10-12-21 @ 10:47)  Wt(kg): --    Height (cm): 175.3 (10-12 @ 10:47)  Weight (kg): 102.1 (10-12 @ 10:47)  BMI (kg/m2): 33.2 (10-12 @ 10:47)  BSA (m2): 2.17 (10-12 @ 10:47)  CAPILLARY BLOOD GLUCOSE          Review of Systems:  10 point ROS negative except as per HPI    PHYSICAL EXAM:  GENERAL: Alert, awake, oriented x3   HEENT: MARTIN, EOMI, neck supple, no JVP  CHEST/LUNG: Bilateral clear breath sounds  HEART: Regular rate and rhythm, no murmur, no gallops, no rub   ABDOMEN: Soft, nontender, non distended  EXTREMITIES: no pedal edema  Neurology: AAOx3, no focal neurological deficit    LABS:  10-12    141  |  106  |  26<H>  ----------------------------<  112<H>  4.1   |  24  |  3.07<H>    Ca    8.9      12 Oct 2021 06:27  Phos  4.0     10-12  Mg     2.0     10-12    TPro  7.6  /  Alb  4.5  /  TBili  0.5  /  DBili      /  AST  25  /  ALT  37  /  AlkPhos  92  10-    Creatinine Trend: 3.07 <--, 4.09 <--                        13.6   8.61  )-----------( 206      ( 12 Oct 2021 06:27 )             40.7     Urine Studies:  Urinalysis Basic - ( 11 Oct 2021 17:18 )    Color: Yellow / Appearance: Clear / S.015 / pH:   Gluc:  / Ketone: NEGATIVE  / Bili: Negative / Urobili: 0.2 E.U./dL   Blood:  / Protein: Trace mg/dL / Nitrite: NEGATIVE   Leuk Esterase: NEGATIVE / RBC: < 5 /HPF / WBC < 5 /HPF   Sq Epi:  / Non Sq Epi: 0-5 /HPF / Bacteria: Present /HPF      Sodium, Random Urine: 32 mmol/L (10-11 @ 19:26)  Creatinine, Random Urine: 165 mg/dL (10-11 @ 19:26)

## 2021-10-12 NOTE — DISCHARGE NOTE PROVIDER - NSFOLLOWUPCLINICS_GEN_ALL_ED_FT
[facility]  Family Medicine  [address], [address] Floor  [place], [place] [address]  Phone: [phone]  Fax:      [facility]  Family Medicine  [address] [place] [address], [address] Floor  [place], [place] [address]  Phone: [phone]  Fax:   Scheduled Appointment: 10/21/2021 9:15 AM

## 2021-10-12 NOTE — PROGRESS NOTE ADULT - ASSESSMENT
32 yo M with PMHx anxiety (fluoxetine), IBS px to [facility] ED on 10/11 with 2d hx of nausea, vomiting, and chills. On admission, found to have pre-renal KAIT with dry MM on exam however CT findings concerning for intrinsic renal disease. Renal consulted in ED and patient is being admitted to [facility] for further management and assessment of acute renal failure of unclear etiology, likely pre-renal.

## 2021-10-12 NOTE — DISCHARGE NOTE PROVIDER - HOSPITAL COURSE
#Discharge: do not delete    32 yo M with PMHx anxiety (fluoxetine), IBS px to [facility] ED on 10/11 with 2d hx of nausea, vomiting, and chills, found to have pre-renal KAIT with dry MM on exam however CT findings concerning for intrinsic renal disease.    Hospital course (by problem):     #Acute renal failure.   On admission, initial labs remarkable for BUN/Cr 35/4.09 with negative UA however urine lytes consistent with pre-renal etiology (FeNa 0.6%) and patient with decreased PO intake on hx and dry MM on exam. Pt reports no hx of renal disease/dysfxn and no urinary sx on ROS. Denies dysuria, hematuria, or urinary retention.   - Renal U/S showing infiltration of perinephric fat B/L  - S/P 3L NS IVF   - Bladder scan negative for retention    #Nausea with vomiting.   Pt px with 2d hx of nausea and NBNB vomiting. Not meeting SIRS criteria on admission. Recently tx with abx (ciprofloxacin) for epididymidis. On exam, non-toxic appearing with dry MM and generalized abd tenderness. No CVA tenderness. Nausea/vomiting more likely in setting of abx use, less likely infectious in origin. QTc WNL  - Zofran PRN for nausea/vomiting  - Protonix QD for GI ppx.    #Epididymitis.   Pt reports recently being seen as an outpatient at urgent care for epididymitis. He reports experiencing several day hx of testicular pain and swelling, denies dysuria/hematuria or any other sx. He was started on ciprofloxacin 500mg BID for a total 7d course (10/6-10/12) however he reports taking last dose on 10/9 and has not been able to continue due to 2d hx of nausea/vomiting. Reports sx have improved since starting abx.  - Pt deferring STI/HIV testing as reports being monogamous with gf and has tested negative recently     #Atelectasis.   On CT on admission, findings concerning for atelectasis in RML and B/L LL. Not in respiratory distress and denies SOB/CEDEÑO. No hx of asthma, COPD. Social hx notable for daily use of vape pen for marijuana.   - Encouraged vape pen cessation   - Respiration status normal    #Fatty liver.   On CT on admission, findings concerning for fatty liver. No abnormalities in liver enzymes on admission. No hx of hepatitis or hepatitis risk factors (no travel to endemic countries, no IVDU). Reports social etOH use, 1 drink/mon with last drink on Saturday prior to admission (1 beer). On exam, generalized TTP in abdomen.   - Outpatient follow-up    Patient was discharged to: Home    New medications: none  Changes to old medications: none  Medications that were stopped: Stop ciprofloxacin    Items to follow up as outpatient: PCP -- Follow up Creatinine    Physical exam at the time of discharge:  Constitutional: Pt resting comfortably in bed; NAD  Head: NC/AT  Eyes: Small conjunctival hemorrhage in L eye  ENT: MMM  Respiratory: CTA B/L; no W/R/R, no retractions  Cardiac: +S1/S2; RRR; no M/R/G  Gastrointestinal: soft, TTP diffusely, no rebound/guarding. BSx4. No suprapubic tenderness. No CVA tenderness.   Extremities: WWP, no clubbing or cyanosis; no peripheral edema  Musculoskeletal: NROM x4; no joint swelling, tenderness or erythema  Vascular: 2+ radial, DP/PT pulses B/L  Neurologic: AAOx3; CNII-XII grossly intact; no focal deficits       #Discharge: do not delete    30 yo M with PMHx anxiety (fluoxetine), IBS px to [facility] ED on 10/11 with 2d hx of nausea, vomiting, and chills, found to have pre-renal KAIT with dry MM on exam however CT findings concerning for intrinsic renal disease.    Hospital course (by problem):     #Acute renal failure.   On admission, initial labs remarkable for BUN/Cr 35/4.09 with negative UA however urine lytes consistent with pre-renal etiology (FeNa 0.6%) and patient with decreased PO intake on hx and dry MM on exam. Pt reports no hx of renal disease/dysfxn and no urinary sx on ROS. Denies dysuria, hematuria, or urinary retention.   - Improvement of Cr 3.07  - Renal U/S showing MRD  - S/P 3L NS IVF   - Bladder scan negative for retention    #Nausea with vomiting.   Pt px with 2d hx of nausea and NBNB vomiting. Not meeting SIRS criteria on admission. Recently tx with abx (ciprofloxacin) for epididymidis. On exam, non-toxic appearing with dry MM and generalized abd tenderness. No CVA tenderness. Nausea/vomiting more likely in setting of abx use, less likely infectious in origin. QTc WNL  - Zofran PRN for nausea/vomiting  - Protonix QD for GI ppx.    #Epididymitis.   Pt reports recently being seen as an outpatient at urgent care for epididymitis. He reports experiencing several day hx of testicular pain and swelling, denies dysuria/hematuria or any other sx. He was started on ciprofloxacin 500mg BID for a total 7d course (10/6-10/12) however he reports taking last dose on 10/9 and has not been able to continue due to 2d hx of nausea/vomiting. Reports sx have improved since starting abx.  - Pt deferring STI/HIV testing as reports being monogamous with gf and has tested negative recently     #Atelectasis.   On CT on admission, findings concerning for atelectasis in RML and B/L LL. Not in respiratory distress and denies SOB/CEDEÑO. No hx of asthma, COPD. Social hx notable for daily use of vape pen for marijuana.   - Encouraged vape pen cessation   - Respiration status normal    #Fatty liver.   On CT on admission, findings concerning for fatty liver. No abnormalities in liver enzymes on admission. No hx of hepatitis or hepatitis risk factors (no travel to endemic countries, no IVDU). Reports social etOH use, 1 drink/mon with last drink on Saturday prior to admission (1 beer). On exam, generalized TTP in abdomen.   - Outpatient follow-up    Patient was discharged to: Home    New medications: none  Changes to old medications: none  Medications that were stopped: Stop ciprofloxacin    Items to follow up as outpatient: PCP -- Follow up Creatinine    Physical exam at the time of discharge:  Constitutional: Pt resting comfortably in bed; NAD  Head: NC/AT  Eyes: Small conjunctival hemorrhage in L eye  ENT: MMM  Respiratory: CTA B/L; no W/R/R, no retractions  Cardiac: +S1/S2; RRR; no M/R/G  Gastrointestinal: soft, TTP diffusely, no rebound/guarding. BSx4. No suprapubic tenderness. No CVA tenderness.   Extremities: WWP, no clubbing or cyanosis; no peripheral edema  Musculoskeletal: NROM x4; no joint swelling, tenderness or erythema  Vascular: 2+ radial, DP/PT pulses B/L  Neurologic: AAOx3; CNII-XII grossly intact; no focal deficits

## 2021-10-12 NOTE — DISCHARGE NOTE PROVIDER - NSDCCPCAREPLAN_GEN_ALL_CORE_FT
PRINCIPAL DISCHARGE DIAGNOSIS  Diagnosis: Acute renal failure (ARF)  Assessment and Plan of Treatment:        PRINCIPAL DISCHARGE DIAGNOSIS  Diagnosis: Acute renal failure (ARF)  Assessment and Plan of Treatment: You presented to the hospital following several days of nausea and vomiting and were found to have signs of acute renal failure or acute kidney failure (KAIT) on labwork.   Acute kidney injury (KAIT) is also called acute kidney failure, or acute renal failure. KAIT happens when your kidneys suddenly stop working correctly. Normally, the kidneys remove fluid, chemicals, and waste from your blood. These wastes are turned into urine by your kidneys. KAIT usually happens over hours or days. When you have KAIT, your kidneys do not remove the waste, chemicals, or extra fluid from your body. A normal amount of urine is not produced. KAIT is usually temporary, but it may become a chronic kidney condition.   While in the hospiital, you received fluids and your labs improved reflecting improvement in your KAIT. Please follow up at [facility] on 10/21/2021 at 9:15am to follow up your lab levels to ensure that your kidneys are recovering fully.

## 2021-10-12 NOTE — CONSULT NOTE ADULT - ATTENDING COMMENTS
KAIT appears to be prerenal and responding to IVF-- no other cause obvous and UA benign. ATN possible if renla fxn stops improving  would cont IVF today -- may be able to dc if eating well and creat cont to fall

## 2021-10-12 NOTE — CONSULT NOTE ADULT - ASSESSMENT
31M with pmhx of anxiety who presents with nausea, vomiting and chills for 2 days w/ poor PO intake. Nephrology consulted for ARF.    Assessment/Plan:   #KAIT likely 2/2 dehydration  Unknown baseline, creatinine peaking at 4  Pt presenting to the ED in the setting of nausea and vomiting for 2 days with chills. On labs found to have Cr of 4.09  Bun of 35, FeNa calculated to be 0.6% suggestive of pre-renal etiology. Li 32 after receiving 2L of NS in the ED. Repeat Cr downtrending. Patient this morning states he feels like he can eat and drink ok and is also still getting IVF.   -Trend BMP daily  -Encourage PO intake; Agree with IVF.  -Would repeat BMP this evening to make sure continues to trend down  -strict I/Os   -renal diet   -avoid nephrotoxic meds     Thank you for the opportunity to participate in the care of your patient. The nephrology service remains available to assist with any questions or concerns. Please feel free to reach us by paging the on-call nephrology fellow for urgent issues or as below.     Hermelindo Barrow D.O.  PGY 4 - Nephrology Fellow  [phone]

## 2021-10-12 NOTE — DISCHARGE NOTE NURSING/CASE MANAGEMENT/SOCIAL WORK - NSDCPEFALRISK_GEN_ALL_CORE
For information on Fall & injury Prevention, visit [email or web address]

## 2021-10-12 NOTE — PROGRESS NOTE ADULT - PROBLEM SELECTOR PLAN 6
F: s/p 3L NS bolus in ED  E: Replete PRN (Caution with KAIT)  N: Diet, Renal  DVT ppx: None (IMPROVE 0)  GI ppx: Protonix  Bowel: None  Dispo: RMF --> Home    FULL CODE

## 2021-10-12 NOTE — DISCHARGE NOTE NURSING/CASE MANAGEMENT/SOCIAL WORK - PATIENT PORTAL LINK FT
You can access the FollowMyHealth Patient Portal offered by [facility] by registering at the following website: [email or web address]. By joining [facility]’s FollowMyHealth portal, you will also be able to view your health information using other applications (apps) compatible with our system.

## 2021-10-12 NOTE — PROGRESS NOTE ADULT - SUBJECTIVE AND OBJECTIVE BOX
***Note in progress***    OVERNIGHT EVENTS: NAEO    SUBJECTIVE / INTERVAL HPI: Patient seen and examined at bedside. Patient denying chest pain, SOB, palpitations, cough. Patient denies fever, chills, HA, Dizziness, change in vision/hearing, N/V, abdominal pain, diarrhea, constipation, hematochezia/melena, dysuria, hematuria, new onset weakness/numbness, LE pain and/or swelling.    Remaining ROS negative       PHYSICAL EXAM:    General: WDWN  HEENT: NC/AT; PERRL, anicteric sclera; MMM  Neck: supple  Cardiovascular: +S1/S2, RRR  Respiratory: CTA B/L; no W/R/R  Gastrointestinal: soft, NT/ND; +BSx4  Extremities: WWP; no edema, clubbing or cyanosis  Vascular: 2+ radial, DP/PT pulses B/L  Neurological: AAOx3; no focal deficits  Psychiatric: pleasant mood and affect  Dermatologic: no appreciable wounds or damage to the skin    VITAL SIGNS:  Vital Signs Last 24 Hrs  T(C): 36.9 (12 Oct 2021 06:46), Max: 37.2 (11 Oct 2021 16:14)  T(F): 98.4 (12 Oct 2021 06:46), Max: 99 (11 Oct 2021 16:14)  HR: 76 (12 Oct 2021 06:46) (65 - 85)  BP: 135/85 (12 Oct 2021 06:46) (133/83 - 156/103)  BP(mean): --  RR: 18 (12 Oct 2021 06:46) (18 - 18)  SpO2: 98% (12 Oct 2021 06:46) (96% - 98%)      MEDICATIONS:  MEDICATIONS  (STANDING):  pantoprazole    Tablet 40 milliGRAM(s) Oral before breakfast  sodium chloride 0.9%. 1000 milliLiter(s) (140 mL/Hr) IV Continuous <Continuous>    MEDICATIONS  (PRN):      ALLERGIES:  Allergies    penicillin (Rash; Urticaria; Hives)    Intolerances    ciprofloxacin (Vomiting)      LABS:                        13.6   8.61  )-----------( 206      ( 12 Oct 2021 06:27 )             40.7     10-12    141  |  106  |  26<H>  ----------------------------<  112<H>  4.1   |  24  |  3.07<H>    Ca    8.9      12 Oct 2021 06:27  Phos  4.0     10-12  Mg     2.0     10-12    TPro  7.6  /  Alb  4.5  /  TBili  0.5  /  DBili  x   /  AST  25  /  ALT  37  /  AlkPhos  92  10-11      Urinalysis Basic - ( 11 Oct 2021 17:18 )    Color: Yellow / Appearance: Clear / S.015 / pH: x  Gluc: x / Ketone: NEGATIVE  / Bili: Negative / Urobili: 0.2 E.U./dL   Blood: x / Protein: Trace mg/dL / Nitrite: NEGATIVE   Leuk Esterase: NEGATIVE / RBC: < 5 /HPF / WBC < 5 /HPF   Sq Epi: x / Non Sq Epi: 0-5 /HPF / Bacteria: Present /HPF      CAPILLARY BLOOD GLUCOSE          RADIOLOGY & ADDITIONAL TESTS: Reviewed.

## 2021-10-12 NOTE — PROGRESS NOTE ADULT - PROBLEM SELECTOR PLAN 1
Pt px with 2d hx of nausea and NBNB vomiting. Not meeting SIRS criteria on admission. Recently tx with abx (ciprofloxacin) for epididymidis. On exam, non-toxic appearing with dry MM and generalized abd tenderness. No CVA tenderness. Nausea/vomiting more likely in setting of abx use, less likely infectious in origin.   - F/U EKG (QTC)  - Zofran PRN for nausea/vomiting  - Protonix QD for GI ppx

## 2021-10-12 NOTE — PROGRESS NOTE ADULT - PROBLEM SELECTOR PLAN 3
Pt reports recently being seen as an outpatient at urgent care for epididymitis. He reports experiencing several day hx of testicular pain and swelling, denies dysuria/hematuria or any other sx. He was started on ciprofloxacin 500mg BID for a total 7d course (10/6-10/12) however he reports taking last dose on 10/9 and has not been able to continue due to 2d hx of nausea/vomiting. Reports sx have improved since starting abx.  - Pt deferring STI/HIV testing as reports being monogamous with gf and has tested negative recently   - F/U testicular U/S (ordered)

## 2021-10-21 ENCOUNTER — APPOINTMENT (OUTPATIENT)
Age: 31
End: 2021-10-21
Payer: COMMERCIAL

## 2021-10-21 ENCOUNTER — MED ADMIN CHARGE (OUTPATIENT)
Age: 31
End: 2021-10-21

## 2021-10-21 ENCOUNTER — NON-APPOINTMENT (OUTPATIENT)
Age: 31
End: 2021-10-21

## 2021-10-21 VITALS
WEIGHT: 229 LBS | BODY MASS INDEX: 33.92 KG/M2 | RESPIRATION RATE: 16 BRPM | DIASTOLIC BLOOD PRESSURE: 92 MMHG | TEMPERATURE: 97.7 F | HEIGHT: 69 IN | HEART RATE: 83 BPM | OXYGEN SATURATION: 97 % | SYSTOLIC BLOOD PRESSURE: 136 MMHG

## 2021-10-21 DIAGNOSIS — Z80.8 FAMILY HISTORY OF MALIGNANT NEOPLASM OF OTHER ORGANS OR SYSTEMS: ICD-10-CM

## 2021-10-21 DIAGNOSIS — Z23 ENCOUNTER FOR IMMUNIZATION: ICD-10-CM

## 2021-10-21 DIAGNOSIS — Z87.19 PERSONAL HISTORY OF OTHER DISEASES OF THE DIGESTIVE SYSTEM: ICD-10-CM

## 2021-10-21 DIAGNOSIS — Z82.49 FAMILY HISTORY OF ISCHEMIC HEART DISEASE AND OTHER DISEASES OF THE CIRCULATORY SYSTEM: ICD-10-CM

## 2021-10-21 DIAGNOSIS — Z83.3 FAMILY HISTORY OF DIABETES MELLITUS: ICD-10-CM

## 2021-10-21 DIAGNOSIS — N17.9 ACUTE KIDNEY FAILURE, UNSPECIFIED: ICD-10-CM

## 2021-10-21 DIAGNOSIS — F41.9 ANXIETY DISORDER, UNSPECIFIED: ICD-10-CM

## 2021-10-21 PROCEDURE — 99203 OFFICE O/P NEW LOW 30 MIN: CPT | Mod: GC,25

## 2021-10-21 PROCEDURE — 36415 COLL VENOUS BLD VENIPUNCTURE: CPT

## 2021-10-21 PROCEDURE — 90686 IIV4 VACC NO PRSV 0.5 ML IM: CPT

## 2021-10-21 PROCEDURE — G0008: CPT

## 2021-10-21 NOTE — PHYSICAL EXAM
[Soft] : abdomen soft [Non Tender] : non-tender [Non-distended] : non-distended [Normal Bowel Sounds] : normal bowel sounds [No CVA Tenderness] : no CVA  tenderness [Normal] : no joint swelling and grossly normal strength and tone

## 2021-10-22 PROBLEM — Z23 NEED FOR INFLUENZA VACCINATION: Status: ACTIVE | Noted: 2021-10-21

## 2021-10-22 PROBLEM — N17.9 ACUTE RENAL FAILURE: Status: ACTIVE | Noted: 2021-10-21

## 2021-10-22 NOTE — PLAN
[FreeTextEntry1] : #Acute renal failure\par Pt was admitted to [facility] with CVA tenderness and N/V in the setting of abx use, pt was found to have a Cr of 4 at time of admission, FeNa .6, Pre renal. Reports no urinary complaints today\par - Follow up BMP\par - Follow up A1c\par \par #IBS\par Pt currently not experinecing symptoms, takes probiotics at home\par - Cont probiotics\par \par #HCM\par -Flu shot today

## 2021-10-22 NOTE — HISTORY OF PRESENT ILLNESS
[FreeTextEntry1] : Establishing care  [de-identified] : 30 y/o M with hx of anxiety and IBS presenting for follow up following discharge from [facility] where he was found to have an KAIT. Pt reports complete resolution of symptoms since discharge but reports baseline abdominal pain secondary to IBS. Denies dysuria, hematuria, back pain, n/v, constipation.

## 2021-10-22 NOTE — END OF VISIT
[FreeTextEntry3] : I saw and evaluated the patient.  The findings and assessment were discussed with the resident and I agree with resident’s plan as documented in the above, in the resident’s note.\par \par Pertinent exam findings: Well-appearing, NAD. \par \par Acute renal failure - Recently hospitalized x1 day.  Cr went from 4->3 w/ IV fluids.  FeNa consistent w/ pre-renal.  Will check a1c to r/o hyperglycemia as a component.  Repeat BMP today.\par \par IBS: Symptoms okay w/ probiotics.  Will consider GI referral in the future if necessary.\par \par Flu shot.\par \par I spent more than 30 minutes for the total time of this encounter, including time spent face-to-face with the patient, chart review, coordinating care, and documentation.

## 2021-10-22 NOTE — REVIEW OF SYSTEMS
[Abdominal Pain] : abdominal pain [Joint Pain] : joint pain [Negative] : Heme/Lymph [Nausea] : no nausea [Constipation] : no constipation [Vomiting] : no vomiting [Heartburn] : no heartburn [Melena] : no melena [Joint Stiffness] : no joint stiffness [Back Pain] : no back pain [Joint Swelling] : no joint swelling

## 2021-10-22 NOTE — ASSESSMENT
[FreeTextEntry1] : 32 y/o M with hx of IBS and anxiety presenting to establish care and discharge follow up for ARF.

## 2021-10-22 NOTE — HEALTH RISK ASSESSMENT
[Good] : ~his/her~ current health as good [] : Yes [Yes] : Yes [Monthly or less (1 pt)] : Monthly or less (1 point) [1 or 2 (0 pts)] : 1 or 2 (0 points) [Never (0 pts)] : Never (0 points) [No falls in past year] : Patient reported no falls in the past year [0] : 2) Feeling down, depressed, or hopeless: Not at all (0) [With Significant Other] : lives with significant other [Employed] : employed [College] : College [Significant Other] : lives with significant other [Sexually Active] : sexually active [Feels Safe at Home] : Feels safe at home [Fully functional (bathing, dressing, toileting, transferring, walking, feeding)] : Fully functional (bathing, dressing, toileting, transferring, walking, feeding) [Fully functional (using the telephone, shopping, preparing meals, housekeeping, doing laundry, using] : Fully functional and needs no help or supervision to perform IADLs (using the telephone, shopping, preparing meals, housekeeping, doing laundry, using transportation, managing medications and managing finances) [Reports normal functional visual acuity (ie: able to read med bottle)] : Reports normal functional visual acuity [FreeTextEntry1] : IBS symptoms and management [de-identified] : Has used cigarettes socially in the past, non regularly  [de-identified] : weight lifting 2x a week [de-identified] : Limits greasy, fried and dairy  [[id number]] : 0 [High Risk Behavior] : no high risk behavior [Reports changes in hearing] : Reports no changes in hearing [Reports changes in vision] : Reports no changes in vision [Reports changes in dental health] : Reports no changes in dental health [FreeTextEntry2] :  [de-identified] : Monogamous with partnert [de-identified] : Last saw dentist 2 months ago

## 2021-10-25 LAB
ANION GAP SERPL CALC-SCNC: 13 MMOL/L
BUN SERPL-MCNC: 15 MG/DL
CALCIUM SERPL-MCNC: 9.8 MG/DL
CHLORIDE SERPL-SCNC: 102 MMOL/L
CO2 SERPL-SCNC: 24 MMOL/L
CREAT SERPL-MCNC: 1.07 MG/DL
ESTIMATED AVERAGE GLUCOSE: 117 MG/DL
GLUCOSE SERPL-MCNC: 84 MG/DL
HBA1C MFR BLD HPLC: 5.7 %
POTASSIUM SERPL-SCNC: 4.3 MMOL/L
SODIUM SERPL-SCNC: 140 MMOL/L

## 2022-05-11 PROBLEM — F41.9 ANXIETY DISORDER, UNSPECIFIED: Chronic | Status: ACTIVE | Noted: 2021-10-11

## 2022-05-11 PROBLEM — K58.9 IRRITABLE BOWEL SYNDROME WITHOUT DIARRHEA: Chronic | Status: ACTIVE | Noted: 2021-10-11

## 2022-05-12 ENCOUNTER — TRANSCRIPTION ENCOUNTER (OUTPATIENT)
Age: 32
End: 2022-05-12

## 2022-05-12 ENCOUNTER — APPOINTMENT (OUTPATIENT)
Dept: INTERNAL MEDICINE | Facility: CLINIC | Age: 32
End: 2022-05-12
Payer: COMMERCIAL

## 2022-05-12 VITALS
DIASTOLIC BLOOD PRESSURE: 81 MMHG | TEMPERATURE: 97.9 F | OXYGEN SATURATION: 99 % | WEIGHT: 223 LBS | HEART RATE: 90 BPM | SYSTOLIC BLOOD PRESSURE: 121 MMHG | HEIGHT: 69 IN | BODY MASS INDEX: 33.03 KG/M2

## 2022-05-12 DIAGNOSIS — M67.40 GANGLION, UNSPECIFIED SITE: ICD-10-CM

## 2022-05-12 PROCEDURE — 99213 OFFICE O/P EST LOW 20 MIN: CPT

## 2022-06-30 ENCOUNTER — APPOINTMENT (OUTPATIENT)
Dept: INTERNAL MEDICINE | Facility: CLINIC | Age: 32
End: 2022-06-30

## 2022-06-30 VITALS
RESPIRATION RATE: 12 BRPM | SYSTOLIC BLOOD PRESSURE: 142 MMHG | HEIGHT: 69 IN | HEART RATE: 96 BPM | TEMPERATURE: 98.2 F | OXYGEN SATURATION: 100 % | DIASTOLIC BLOOD PRESSURE: 83 MMHG

## 2022-06-30 PROCEDURE — 99213 OFFICE O/P EST LOW 20 MIN: CPT | Mod: 25,GC

## 2022-06-30 NOTE — REVIEW OF SYSTEMS
[Postnasal Drip] : postnasal drip [Nasal Discharge] : nasal discharge [Cough] : cough [Diarrhea] : diarrhea [Muscle Pain] : muscle pain [Negative] : Psychiatric [Fever] : no fever [Chills] : no chills [Fatigue] : no fatigue [Hot Flashes] : no hot flashes [Night Sweats] : no night sweats [Recent Change In Weight] : ~T no recent weight change [Earache] : no earache [Hearing Loss] : no hearing loss [Nosebleeds] : no nosebleeds [Sore Throat] : no sore throat [Hoarseness] : no hoarseness [Shortness Of Breath] : no shortness of breath [Wheezing] : no wheezing [Dyspnea on Exertion] : not dyspnea on exertion [Abdominal Pain] : no abdominal pain [Nausea] : no nausea [Constipation] : no constipation [Vomiting] : no vomiting [Heartburn] : no heartburn [Melena] : no melena [Joint Pain] : no joint pain [Joint Stiffness] : no joint stiffness [Muscle Weakness] : no muscle weakness [Back Pain] : no back pain [Joint Swelling] : no joint swelling

## 2022-06-30 NOTE — ASSESSMENT
[FreeTextEntry1] : Mr Von is a 32M with PMH of anxiety and IBS who presents today complaining of 3 day history of cough, sinus congestion, myalgias, head pressure.\par \par #upper respiratory symptoms x3 days \par -denies fevers or SOB\par -2 negative covid tests at home \par -afebrile in office and satting 100% on RA\par -likely viral URI, differential also includes covid, flu. unlikely bacterial in origin.\par -recommend supportive care and OTC tylenol, robitussin, de-congestants \par -recommend 5 day isolation period \par -RTC in 4 months for annual comprehensive visit or sooner if condition worsens

## 2022-06-30 NOTE — PHYSICAL EXAM
[No Varicosities] : no varicosities [No Edema] : there was no peripheral edema [No Extremity Clubbing/Cyanosis] : no extremity clubbing/cyanosis [Normal] : normal gait, coordination grossly intact, no focal deficits and deep tendon reflexes were 2+ and symmetric

## 2022-06-30 NOTE — HISTORY OF PRESENT ILLNESS
[FreeTextEntry8] : Mr Longoria is a 32M with PMH of anxiety and IBS who presents today complaining of 3 day history of cough, sinus congestion, myalgias, head pressure. He reports monday night he started to feel a tickle in his throat and then tuesday experienced dry cough, head pressure, sinus congestion, rhinorrhea clear in color, and myalgias. He denies sore throat, productive cough, SOB, chest pain, or fevers at home. He took 2 covid tests at home (1 on monday night and 1 on tuesday afternoon), both negative. He also endorses 2 episodes of diarrhea since tuesday, non-bloody. Denies any other complaints. Took dayquil this morning which he does not think has helped much.

## 2022-10-20 NOTE — ED ADULT NURSE NOTE - ISOLATION TYPE:
None Ear Star Wedge Flap Text: The defect edges were debeveled with a #15 blade scalpel.  Given the location of the defect and the proximity to free margins (helical rim) an ear star wedge flap was deemed most appropriate.  Using a sterile surgical marker, the appropriate flap was drawn incorporating the defect and placing the expected incisions between the helical rim and antihelix where possible.  The area thus outlined was incised through and through with a #15 scalpel blade.

## 2022-11-07 NOTE — PROGRESS NOTE ADULT - PROBLEM SELECTOR PLAN 2
Irasema Melara,    Your lab work showed nothing of concern.      Please let us know if you have any questions.     Thanks,   Tere Hull NP
On admission, initial labs remarkable for BUN/Cr 35/4.09 with negative UA however urine lytes consistent with pre-renal etiology (FeNa 0.6%) and patient with decreased PO intake on hx and dry MM on exam. Pt reports no hx of renal disease/dysfxn and no urinary sx on ROS. Denies dysuria, hematuria, or urinary retention.   - C/w IVF (s/p 3L NS ordered in ED)  - F/U repeat UA, urine lytes, protein-creatinine ratio, osm in AM  - Bladder scan x1 (R/O urinary retention)  - F/U renal ultrasound (performed, pending final read)  - Renal consulted

## 2022-12-05 ENCOUNTER — APPOINTMENT (OUTPATIENT)
Dept: INTERNAL MEDICINE | Facility: CLINIC | Age: 32
End: 2022-12-05

## 2022-12-05 VITALS
SYSTOLIC BLOOD PRESSURE: 148 MMHG | BODY MASS INDEX: 32.05 KG/M2 | WEIGHT: 217 LBS | TEMPERATURE: 96.7 F | DIASTOLIC BLOOD PRESSURE: 85 MMHG | HEART RATE: 76 BPM | OXYGEN SATURATION: 98 %

## 2022-12-05 VITALS — DIASTOLIC BLOOD PRESSURE: 72 MMHG | SYSTOLIC BLOOD PRESSURE: 128 MMHG

## 2022-12-05 DIAGNOSIS — Z11.4 ENCOUNTER FOR SCREENING FOR HUMAN IMMUNODEFICIENCY VIRUS [HIV]: ICD-10-CM

## 2022-12-05 DIAGNOSIS — Z11.59 ENCOUNTER FOR SCREENING FOR OTHER VIRAL DISEASES: ICD-10-CM

## 2022-12-05 DIAGNOSIS — Z13.1 ENCOUNTER FOR SCREENING FOR DIABETES MELLITUS: ICD-10-CM

## 2022-12-05 DIAGNOSIS — Z00.00 ENCOUNTER FOR GENERAL ADULT MEDICAL EXAMINATION W/OUT ABNORMAL FINDINGS: ICD-10-CM

## 2022-12-05 PROCEDURE — G0444 DEPRESSION SCREEN ANNUAL: CPT | Mod: 59

## 2022-12-05 PROCEDURE — 90686 IIV4 VACC NO PRSV 0.5 ML IM: CPT

## 2022-12-05 PROCEDURE — 36415 COLL VENOUS BLD VENIPUNCTURE: CPT

## 2022-12-05 PROCEDURE — 99395 PREV VISIT EST AGE 18-39: CPT | Mod: 25,GC

## 2022-12-05 PROCEDURE — G0008: CPT

## 2022-12-05 RX ORDER — FLUOXETINE HYDROCHLORIDE 40 MG/1
40 CAPSULE ORAL
Refills: 0 | Status: ACTIVE | COMMUNITY

## 2022-12-08 LAB
ANION GAP SERPL CALC-SCNC: 14 MMOL/L
BUN SERPL-MCNC: 16 MG/DL
CALCIUM SERPL-MCNC: 9.8 MG/DL
CHLORIDE SERPL-SCNC: 102 MMOL/L
CHOLEST SERPL-MCNC: 191 MG/DL
CO2 SERPL-SCNC: 23 MMOL/L
CREAT SERPL-MCNC: 0.94 MG/DL
EGFR: 110 ML/MIN/1.73M2
ESTIMATED AVERAGE GLUCOSE: 108 MG/DL
GLUCOSE SERPL-MCNC: 91 MG/DL
HBA1C MFR BLD HPLC: 5.4 %
HCV AB SER QL: NONREACTIVE
HCV S/CO RATIO: 0.15 S/CO
HDLC SERPL-MCNC: 47 MG/DL
HIV1+2 AB SPEC QL IA.RAPID: NONREACTIVE
LDLC SERPL CALC-MCNC: 126 MG/DL
NONHDLC SERPL-MCNC: 144 MG/DL
POTASSIUM SERPL-SCNC: 4.4 MMOL/L
SODIUM SERPL-SCNC: 138 MMOL/L
TRIGL SERPL-MCNC: 87 MG/DL

## 2023-01-26 ENCOUNTER — APPOINTMENT (OUTPATIENT)
Dept: UROLOGY | Facility: CLINIC | Age: 33
End: 2023-01-26
Payer: COMMERCIAL

## 2023-01-26 VITALS
TEMPERATURE: 97.8 F | OXYGEN SATURATION: 96 % | SYSTOLIC BLOOD PRESSURE: 132 MMHG | DIASTOLIC BLOOD PRESSURE: 82 MMHG | HEART RATE: 80 BPM

## 2023-01-26 PROCEDURE — 51798 US URINE CAPACITY MEASURE: CPT

## 2023-01-26 PROCEDURE — 99204 OFFICE O/P NEW MOD 45 MIN: CPT | Mod: 25

## 2023-01-26 NOTE — HISTORY OF PRESENT ILLNESS
[FreeTextEntry1] : CC: Urinary frequency \par \par HPI: \par Daytime frequency, exacerbated by coffee and alcohol\par Severity is q1-2 hours\par Started about a week ago when on vacation\par He "gallo feels it is improving", so possibly alleviated by time, no other alleviating factors. \par \par No other associated symptoms, denies hesitancy, weak stream, straining, intermittency, splaying, PV dribbling, urgency, incontinence, gross hematuria, UTI, STI, flank pain/colic\par \par PVR 5 cc\par \par \par FAMHX: No  family history \par SURGHX:  Broken left arm, hernia \par SOCIAL: , single, sex with women, non tobacco smoker\par ROS: Negative 10 system review \par

## 2023-01-26 NOTE — ASSESSMENT
[FreeTextEntry1] : Diagnosis: \par Urinary frequency\par \par Very much appears benign with normal exam, empties well, no burning, discharge, pain\par \par Plan \par UA\par UCX\par Fran/Chlamydia\par if all benign then observe unless symptoms fail to resolve or worsening/changing\par \par Renzo Mock MD, FACS, FRCS \par  of Urology [facility]\par Director of Laparoscopic and Robotic Surgery \par [facility] Director of Urology, [facility] \par Professor of Urology\par \par (Office) [phone]\par (Cell)  [phone] \par [email or web address]\par \par \par

## 2023-01-26 NOTE — PHYSICAL EXAM
[General Appearance - Well Developed] : well developed [General Appearance - Well Nourished] : well nourished [Normal Appearance] : normal appearance [Well Groomed] : well groomed [General Appearance - In No Acute Distress] : no acute distress [Edema] : no peripheral edema [] : no respiratory distress [Respiration, Rhythm And Depth] : normal respiratory rhythm and effort [Exaggerated Use Of Accessory Muscles For Inspiration] : no accessory muscle use [Abdomen Soft] : soft [Abdomen Tenderness] : non-tender [Costovertebral Angle Tenderness] : no ~M costovertebral angle tenderness [Urethral Meatus] : meatus normal [Penis Abnormality] : normal circumcised penis [Urinary Bladder Findings] : the bladder was normal on palpation [Scrotum] : the scrotum was normal [Epididymis] : the epididymides were normal [Testes Tenderness] : no tenderness of the testes [Testes Mass (___cm)] : there were no testicular masses [No Prostate Nodules] : no prostate nodules [Prostate Size ___ gm] : prostate size [unfilled] gm [No Palpable Adenopathy] : no palpable adenopathy

## 2023-03-29 ENCOUNTER — APPOINTMENT (OUTPATIENT)
Dept: UROLOGY | Facility: CLINIC | Age: 33
End: 2023-03-29
Payer: COMMERCIAL

## 2023-03-29 VITALS
OXYGEN SATURATION: 98 % | DIASTOLIC BLOOD PRESSURE: 86 MMHG | HEART RATE: 80 BPM | TEMPERATURE: 97.8 F | SYSTOLIC BLOOD PRESSURE: 125 MMHG

## 2023-03-29 PROCEDURE — 99213 OFFICE O/P EST LOW 20 MIN: CPT

## 2023-03-30 NOTE — HISTORY OF PRESENT ILLNESS
[FreeTextEntry1] : \par \par CC: Urinary frequency\par \par \par For the past 2 months feels that he has had an increase in urinary frequency.\par Feels need to void every 2 hours,  with mild urgency.  Denies any urinary incontinence \par \par Fluids:\par Coffee,  1-2 cups/day\par Blair water, OJ, Milk\par \par Denies any gross hematuria or dysuria\par No penile discharge \par No history of STI \par \par FAMHX: No  family history \par SURGHX: Broken left arm, hernia \par SOCIAL: , single, sex with women, non tobacco smoker\par

## 2023-03-30 NOTE — ASSESSMENT
[FreeTextEntry1] : 33 year old male with ongoing urinary frequency\par -UCx/Ua\par -Chlamydia/GC, Ureaplasma/Mycoplasma \par -Discussed bladder irritants with patient.  He will cut back carbonated beverages he consumes.\par -PVR was 9 cc\par -If no improvement consider PFPT\par \par Follow up in 3 months

## 2023-04-03 DIAGNOSIS — N39.0 URINARY TRACT INFECTION, SITE NOT SPECIFIED: ICD-10-CM

## 2023-04-03 LAB — BACTERIA UR CULT: ABNORMAL

## 2023-04-03 RX ORDER — LEVOFLOXACIN 250 MG/1
250 TABLET, FILM COATED ORAL DAILY
Qty: 7 | Refills: 0 | Status: ACTIVE | COMMUNITY
Start: 2023-04-03 | End: 1900-01-01

## 2023-04-04 ENCOUNTER — APPOINTMENT (OUTPATIENT)
Dept: INTERNAL MEDICINE | Facility: CLINIC | Age: 33
End: 2023-04-04

## 2023-04-11 ENCOUNTER — APPOINTMENT (OUTPATIENT)
Dept: INTERNAL MEDICINE | Facility: CLINIC | Age: 33
End: 2023-04-11

## 2023-04-18 ENCOUNTER — APPOINTMENT (OUTPATIENT)
Dept: INTERNAL MEDICINE | Facility: CLINIC | Age: 33
End: 2023-04-18

## 2023-04-20 ENCOUNTER — APPOINTMENT (OUTPATIENT)
Dept: INTERNAL MEDICINE | Facility: CLINIC | Age: 33
End: 2023-04-20

## 2023-04-27 ENCOUNTER — APPOINTMENT (OUTPATIENT)
Dept: INTERNAL MEDICINE | Facility: CLINIC | Age: 33
End: 2023-04-27
Payer: COMMERCIAL

## 2023-04-27 VITALS
RESPIRATION RATE: 16 BRPM | BODY MASS INDEX: 32.14 KG/M2 | HEIGHT: 69 IN | WEIGHT: 217 LBS | DIASTOLIC BLOOD PRESSURE: 91 MMHG | SYSTOLIC BLOOD PRESSURE: 129 MMHG | TEMPERATURE: 97.1 F | HEART RATE: 73 BPM

## 2023-04-27 DIAGNOSIS — K58.0 IRRITABLE BOWEL SYNDROME WITH DIARRHEA: ICD-10-CM

## 2023-04-27 PROCEDURE — 99214 OFFICE O/P EST MOD 30 MIN: CPT | Mod: 25,GC

## 2023-04-27 NOTE — PHYSICAL EXAM
[Normal] : normal gait, coordination grossly intact, no focal deficits and deep tendon reflexes were 2+ and symmetric [de-identified] : No suprapubic tenderness, no CVA tenderness

## 2023-04-27 NOTE — ASSESSMENT
[FreeTextEntry1] : Patient is 33yo M w PMhx of anxiety, IBS, and recent UTI coming in to the clinic for UTI like symptoms.\par \par #IBS\par - Follows w GI, recent EGD negative except for gastritis, cscope with diverticulosis, one polyp which was removed.\par - Follow w GI\par \par #UTI\par - Previous UTI w UCx growing E.faecalis, STI testing negative\par - Symptoms resolved s/p Abx, but now recurring, urinary frequency.\par - Repeat UA today w reflux culture\par - GC Chlamydia ordered today.\par \par #Anxiety\par - Symptoms well controlled with fluoxetine 40mg qd\par

## 2023-04-27 NOTE — HISTORY OF PRESENT ILLNESS
[FreeTextEntry8] : Patient is 33yo M w PMhx of anxiety, IBS, and recent UTI coming in to the clinic for UTI like symptoms.  Patient reports he is feeling fairly well. Reports recent urinary symptoms and frequency for which he followed up with urology for. Was treated for UTI with positive UCx with E. faecalis with ciprofloxacin 5 day course. Patient reports medication compliance. Reports symptoms resolved for two days but then recurred. Main symptom is urinary frequency. Reports going to the bathroom about 8-10 times a day, previously it was about 5 times a day. Otherwise patient denies any dysuria, hematuria, urinary discharge, or lesions. Symptoms are not causing any distress.\par Denies fever, chills, c/p, palpitations, SOB, cough, congestion, abdominal pain, nausea, vomiting, diarrhea, constipation, dysuria, hematuria, LE swelling or rash.\par

## 2023-05-02 LAB
APPEARANCE: CLEAR
BILIRUBIN URINE: NEGATIVE
BLOOD URINE: NEGATIVE
C TRACH RRNA SPEC QL NAA+PROBE: NOT DETECTED
COLOR: YELLOW
GLUCOSE QUALITATIVE U: NEGATIVE MG/DL
KETONES URINE: NEGATIVE MG/DL
LEUKOCYTE ESTERASE URINE: NEGATIVE
N GONORRHOEA RRNA SPEC QL NAA+PROBE: NOT DETECTED
NITRITE URINE: NEGATIVE
PH URINE: 6
PROTEIN URINE: NEGATIVE MG/DL
SOURCE AMPLIFICATION: NORMAL
SPECIFIC GRAVITY URINE: 1.02
UROBILINOGEN URINE: 0.2 MG/DL

## 2023-06-09 ENCOUNTER — APPOINTMENT (OUTPATIENT)
Dept: INTERNAL MEDICINE | Facility: CLINIC | Age: 33
End: 2023-06-09
Payer: COMMERCIAL

## 2023-06-09 VITALS
HEIGHT: 70 IN | OXYGEN SATURATION: 98 % | HEART RATE: 93 BPM | SYSTOLIC BLOOD PRESSURE: 125 MMHG | TEMPERATURE: 97.1 F | WEIGHT: 215 LBS | BODY MASS INDEX: 30.78 KG/M2 | DIASTOLIC BLOOD PRESSURE: 89 MMHG

## 2023-06-09 DIAGNOSIS — F90.0 ATTENTION-DEFICIT HYPERACTIVITY DISORDER, PREDOMINANTLY INATTENTIVE TYPE: ICD-10-CM

## 2023-06-09 DIAGNOSIS — R35.0 FREQUENCY OF MICTURITION: ICD-10-CM

## 2023-06-09 DIAGNOSIS — K21.9 GASTRO-ESOPHAGEAL REFLUX DISEASE W/OUT ESOPHAGITIS: ICD-10-CM

## 2023-06-09 PROCEDURE — 99213 OFFICE O/P EST LOW 20 MIN: CPT

## 2023-06-09 RX ORDER — ESOMEPRAZOLE MAGNESIUM 40 MG/1
40 CAPSULE, DELAYED RELEASE ORAL DAILY
Qty: 30 | Refills: 0 | Status: ACTIVE | COMMUNITY
Start: 2023-06-09

## 2023-06-09 RX ORDER — DEXTROAMPHETAMINE SACCHARATE, AMPHETAMINE ASPARTATE, DEXTROAMPHETAMINE SULFATE AND AMPHETAMINE SULFATE 2.5; 2.5; 2.5; 2.5 MG/1; MG/1; MG/1; MG/1
10 TABLET ORAL DAILY
Qty: 30 | Refills: 0 | Status: ACTIVE | COMMUNITY
Start: 2023-06-09

## 2023-06-28 ENCOUNTER — APPOINTMENT (OUTPATIENT)
Dept: UROLOGY | Facility: CLINIC | Age: 33
End: 2023-06-28

## 2023-09-21 NOTE — ED PROVIDER NOTE - HEME LYMPH, MLM
No adenopathy or splenomegaly. No cervical or inguinal lymphadenopathy. Incidental Superficial Basal Cell Carcinoma Histology Text: Nests of atypical basaloid nests were noted along the basilar epidermis, consistent with superficial BCC.